# Patient Record
Sex: FEMALE | Employment: FULL TIME | URBAN - NONMETROPOLITAN AREA
[De-identification: names, ages, dates, MRNs, and addresses within clinical notes are randomized per-mention and may not be internally consistent; named-entity substitution may affect disease eponyms.]

---

## 2023-02-09 ENCOUNTER — OFFICE VISIT (OUTPATIENT)
Dept: PRIMARY CARE CLINIC | Age: 33
End: 2023-02-09
Payer: COMMERCIAL

## 2023-02-09 VITALS
DIASTOLIC BLOOD PRESSURE: 83 MMHG | HEIGHT: 69 IN | SYSTOLIC BLOOD PRESSURE: 127 MMHG | BODY MASS INDEX: 35.7 KG/M2 | OXYGEN SATURATION: 96 % | TEMPERATURE: 98.9 F | HEART RATE: 98 BPM | WEIGHT: 241 LBS

## 2023-02-09 DIAGNOSIS — J01.00 ACUTE MAXILLARY SINUSITIS, RECURRENCE NOT SPECIFIED: Primary | ICD-10-CM

## 2023-02-09 PROBLEM — F41.9 ANXIETY DISORDER: Status: ACTIVE | Noted: 2022-04-07

## 2023-02-09 PROBLEM — G47.00 INSOMNIA: Status: ACTIVE | Noted: 2023-02-09

## 2023-02-09 PROBLEM — G43.909 MIGRAINE HEADACHE: Status: ACTIVE | Noted: 2017-04-28

## 2023-02-09 PROCEDURE — G8417 CALC BMI ABV UP PARAM F/U: HCPCS | Performed by: NURSE PRACTITIONER

## 2023-02-09 PROCEDURE — 1036F TOBACCO NON-USER: CPT | Performed by: NURSE PRACTITIONER

## 2023-02-09 PROCEDURE — G8484 FLU IMMUNIZE NO ADMIN: HCPCS | Performed by: NURSE PRACTITIONER

## 2023-02-09 PROCEDURE — G8427 DOCREV CUR MEDS BY ELIG CLIN: HCPCS | Performed by: NURSE PRACTITIONER

## 2023-02-09 PROCEDURE — 99202 OFFICE O/P NEW SF 15 MIN: CPT | Performed by: NURSE PRACTITIONER

## 2023-02-09 RX ORDER — B-COMPLEX WITH VITAMIN C
1 TABLET ORAL 2 TIMES DAILY WITH MEALS
COMMUNITY

## 2023-02-09 RX ORDER — VENLAFAXINE HYDROCHLORIDE 75 MG/1
CAPSULE, EXTENDED RELEASE ORAL
COMMUNITY
Start: 2023-01-21

## 2023-02-09 RX ORDER — AMOXICILLIN AND CLAVULANATE POTASSIUM 875; 125 MG/1; MG/1
1 TABLET, FILM COATED ORAL 2 TIMES DAILY
Qty: 14 TABLET | Refills: 0 | Status: SHIPPED | OUTPATIENT
Start: 2023-02-09 | End: 2023-02-16

## 2023-02-09 RX ORDER — MAGNESIUM GLUCONATE 30 MG(550)
30 TABLET ORAL 2 TIMES DAILY
COMMUNITY

## 2023-02-09 ASSESSMENT — ENCOUNTER SYMPTOMS
SINUS PRESSURE: 1
SORE THROAT: 0
SINUS PAIN: 1
GASTROINTESTINAL NEGATIVE: 1
SHORTNESS OF BREATH: 0
RHINORRHEA: 1
FACIAL SWELLING: 0
STRIDOR: 0
CHEST TIGHTNESS: 0
WHEEZING: 0
COUGH: 1

## 2023-02-09 NOTE — PROGRESS NOTES
Chief Complaint:   Cough (Ongoing x 1.5 weeks. Pt gets covid tested twice a week at work, last time was 2/7 -negative/), Jaw Pain (Ongoing x 1.5 week), Nasal Congestion, and Nausea      History of Present Illness   Source of history provided by:  patient. Elliot Sargent is a 27 y.o. old female with a past medical history of: No past medical history on file. Presents to the walk in clinic for evaluation of right sided maxillary sinus pressure, nasal congestion, discolored nasal drainage, bilateral ear congestion, non productive cough,  x 10 days. Has been taking Delsym OTC without relief. ROS   Review of Systems   Constitutional:  Positive for chills. HENT:  Positive for congestion, rhinorrhea, sinus pressure and sinus pain. Negative for dental problem, ear discharge, ear pain, facial swelling and sore throat. Respiratory:  Positive for cough. Negative for chest tightness, shortness of breath, wheezing and stridor. Gastrointestinal: Negative. Hematological:  Positive for adenopathy. Past Surgical History:  has no past surgical history on file. Social History:  reports that she has never smoked. She has never used smokeless tobacco. She reports that she does not currently use alcohol. She reports that she does not use drugs. Family History: family history is not on file. Allergies: Patient has no known allergies. Physical Exam       VS:  /83 (Site: Left Upper Arm, Position: Sitting)   Pulse 98   Temp 98.9 °F (37.2 °C) (Oral)   Ht 5' 9\" (1.753 m)   Wt 241 lb (109.3 kg)   SpO2 96%   BMI 35.59 kg/m²        Constitutional:  Alert, development consistent with age. Head:  TTP over the right maxillary sinuses. Ears:   Bilateral pinna normal. TMs without erythema or perforation bilaterally. Canals normal bilaterally without swelling or exudate  Nose:  No congestion of the nasal mucosa. There is  injection to middle turbinates bilaterally.    Throat: Minimal posterior pharyngeal erythema with mild post nasal drip present. No exudate or tonsillar hypertrophy noted. Neck:  Supple. There is right sided posterior cervical/submandibular adenopathy. Lungs: CTAB without wheezes, rales, or rhonchi. Heart:  Regular rate and rhythm, normal heart sounds, without ectopy, gallops, or rubs. Skin:  Normal turgor. Warm, dry, without visible rash. Neurological:  Alert and oriented. Motor functions intact. Lab / Imaging Results   (All laboratory and radiology results have been personally reviewed by myself)  Labs:  No results found for this visit on 02/09/23. Assessment / Plan     Impression(s):  Silvia Morse was seen today for cough, jaw pain, nasal congestion and nausea. Diagnoses and all orders for this visit:    Acute maxillary sinusitis, recurrence not specified  -     amoxicillin-clavulanate (AUGMENTIN) 875-125 MG per tablet; Take 1 tablet by mouth 2 times daily for 7 days      -Treating today with concern for bacterial sinusitis with complaints of unilateral maxillary sinus pain with Augmentin, administration and side effects discussed. Encourage that patient take with a probiotic and examples provided. - Symptomatic treatment discussed including over-the-counter acetaminophen as labeled as needed for pain/fever. Good oral hydration. Diet as tolerated. - Follow-up with PCP if no improvement. ED for any worsening or concern.       MESSI Rojas - CNP